# Patient Record
Sex: FEMALE | Race: WHITE | URBAN - METROPOLITAN AREA
[De-identification: names, ages, dates, MRNs, and addresses within clinical notes are randomized per-mention and may not be internally consistent; named-entity substitution may affect disease eponyms.]

---

## 2023-06-22 ENCOUNTER — OFFICE VISIT (OUTPATIENT)
Facility: LOCATION | Age: 58
End: 2023-06-22
Payer: COMMERCIAL

## 2023-06-22 DIAGNOSIS — H16.223 KERATOCONJUNCT SICCA, NOT SPECIFIED AS SJOGREN'S, BILATERAL: ICD-10-CM

## 2023-06-22 DIAGNOSIS — H04.123 DRY EYE SYNDROME OF BILATERAL LACRIMAL GLANDS: Primary | ICD-10-CM

## 2023-06-22 RX ORDER — LOTEPREDNOL ETABONATE 5 MG/ML
0.5 % SUSPENSION/ DROPS OPHTHALMIC
Qty: 5 | Refills: 1 | Status: ACTIVE
Start: 2023-06-22

## 2023-06-22 ASSESSMENT — INTRAOCULAR PRESSURE
OS: 13
OD: 12

## 2023-06-22 NOTE — IMPRESSION/PLAN
Impression: Keratoconjunct sicca, not specified as Sjogren's, bilateral: T22.461. Plan: See dry eye plan.

## 2023-06-22 NOTE — IMPRESSION/PLAN
Impression: Dry eye syndrome of bilateral lacrimal glands: H04.123. Silicone plugs BLL in place. Replaced extended plugs (0.4 mm) BUL today. Patient tolerated the procedure well. Plan: Patient to continue PFATs QID OU. Patient never received Loteprednol. Start Loteprednol (0.5%) QID OU. Patient can decrease to BID OU once discomfort subsides -rx sent to [facility]. RTC in 6 weeks for re-evaluation with Dr. Cheyanne Wills. Consider dry eye medicine next visit.

## 2024-01-11 ENCOUNTER — OFFICE VISIT (OUTPATIENT)
Facility: LOCATION | Age: 59
End: 2024-01-11

## 2024-01-11 DIAGNOSIS — H04.123 DRY EYE SYNDROME OF BILATERAL LACRIMAL GLANDS: Primary | ICD-10-CM

## 2024-01-11 PROCEDURE — 99213 OFFICE O/P EST LOW 20 MIN: CPT | Performed by: OPHTHALMOLOGY

## 2024-01-11 RX ORDER — CYCLOSPORINE 0 G/ML
0.09 % SOLUTION/ DROPS OPHTHALMIC; TOPICAL
Qty: 90 | Refills: 3 | Status: ACTIVE
Start: 2024-01-11

## 2024-01-11 ASSESSMENT — INTRAOCULAR PRESSURE
OS: 12
OD: 12

## 2024-02-08 ENCOUNTER — OFFICE VISIT (OUTPATIENT)
Facility: LOCATION | Age: 59
End: 2024-02-08
Payer: COMMERCIAL

## 2024-02-08 DIAGNOSIS — H16.223 KERATOCONJUNCT SICCA, NOT SPECIFIED AS SJOGREN'S, BILATERAL: ICD-10-CM

## 2024-02-08 PROCEDURE — 68761 CLOSE TEAR DUCT OPENING: CPT | Performed by: OPHTHALMOLOGY

## 2024-02-08 ASSESSMENT — INTRAOCULAR PRESSURE
OS: 14
OD: 12

## 2024-06-13 ENCOUNTER — OFFICE VISIT (OUTPATIENT)
Facility: LOCATION | Age: 59
End: 2024-06-13
Payer: COMMERCIAL

## 2024-06-13 DIAGNOSIS — H16.223 KERATOCONJUNCT SICCA, NOT SPECIFIED AS SJOGREN'S, BILATERAL: ICD-10-CM

## 2024-06-13 DIAGNOSIS — H04.123 DRY EYE SYNDROME OF BILATERAL LACRIMAL GLANDS: Primary | ICD-10-CM

## 2024-06-13 PROCEDURE — 99213 OFFICE O/P EST LOW 20 MIN: CPT | Performed by: OPHTHALMOLOGY

## 2024-06-13 RX ORDER — PERFLUOROHEXYLOCTANE 1 MG/MG
100 % SOLUTION OPHTHALMIC
Qty: 3 | Refills: 11 | Status: INACTIVE
Start: 2024-06-13 | End: 2025-06-12

## 2024-09-12 ENCOUNTER — OFFICE VISIT (OUTPATIENT)
Facility: LOCATION | Age: 59
End: 2024-09-12
Payer: COMMERCIAL

## 2024-09-12 DIAGNOSIS — H04.123 DRY EYE SYNDROME OF BILATERAL LACRIMAL GLANDS: Primary | ICD-10-CM

## 2024-09-12 DIAGNOSIS — H16.223 KERATOCONJUNCT SICCA, NOT SPECIFIED AS SJOGREN'S, BILATERAL: ICD-10-CM

## 2024-09-12 DIAGNOSIS — H25.13 AGE-RELATED NUCLEAR CATARACT, BILATERAL: ICD-10-CM

## 2024-09-12 PROCEDURE — 99213 OFFICE O/P EST LOW 20 MIN: CPT | Performed by: OPHTHALMOLOGY

## 2024-09-12 ASSESSMENT — INTRAOCULAR PRESSURE
OS: 16
OD: 16

## 2024-10-31 ENCOUNTER — OFFICE VISIT (OUTPATIENT)
Facility: LOCATION | Age: 59
End: 2024-10-31
Payer: COMMERCIAL

## 2024-10-31 DIAGNOSIS — H25.13 AGE-RELATED NUCLEAR CATARACT, BILATERAL: ICD-10-CM

## 2024-10-31 DIAGNOSIS — H16.223 KERATOCONJUNCT SICCA, NOT SPECIFIED AS SJOGREN'S, BILATERAL: ICD-10-CM

## 2024-10-31 DIAGNOSIS — H04.123 DRY EYE SYNDROME OF BILATERAL LACRIMAL GLANDS: Primary | ICD-10-CM

## 2024-10-31 PROCEDURE — 68761 CLOSE TEAR DUCT OPENING: CPT | Performed by: OPHTHALMOLOGY

## 2024-12-12 ENCOUNTER — OFFICE VISIT (OUTPATIENT)
Facility: LOCATION | Age: 59
End: 2024-12-12
Payer: COMMERCIAL

## 2024-12-12 DIAGNOSIS — H16.223 KERATOCONJUNCT SICCA, NOT SPECIFIED AS SJOGREN'S, BILATERAL: ICD-10-CM

## 2024-12-12 DIAGNOSIS — H04.123 DRY EYE SYNDROME OF BILATERAL LACRIMAL GLANDS: Primary | ICD-10-CM

## 2024-12-12 DIAGNOSIS — H25.13 AGE-RELATED NUCLEAR CATARACT, BILATERAL: ICD-10-CM

## 2024-12-12 PROCEDURE — 99213 OFFICE O/P EST LOW 20 MIN: CPT | Performed by: OPTOMETRIST

## 2024-12-12 ASSESSMENT — INTRAOCULAR PRESSURE
OD: 15
OS: 15

## 2024-12-23 ENCOUNTER — OFFICE VISIT (OUTPATIENT)
Facility: LOCATION | Age: 59
End: 2024-12-23
Payer: COMMERCIAL

## 2024-12-23 DIAGNOSIS — H16.223 KERATOCONJUNCT SICCA, NOT SPECIFIED AS SJOGREN'S, BILATERAL: ICD-10-CM

## 2024-12-23 DIAGNOSIS — H04.123 DRY EYE SYNDROME OF BILATERAL LACRIMAL GLANDS: Primary | ICD-10-CM

## 2025-02-21 ENCOUNTER — OFFICE VISIT (OUTPATIENT)
Facility: LOCATION | Age: 60
End: 2025-02-21
Payer: COMMERCIAL

## 2025-02-21 DIAGNOSIS — H04.123 DRY EYE SYNDROME OF BILATERAL LACRIMAL GLANDS: Primary | ICD-10-CM

## 2025-02-21 DIAGNOSIS — H25.13 AGE-RELATED NUCLEAR CATARACT, BILATERAL: ICD-10-CM

## 2025-02-21 DIAGNOSIS — H16.223 KERATOCONJUNCT SICCA, NOT SPECIFIED AS SJOGREN'S, BILATERAL: ICD-10-CM

## 2025-02-21 PROCEDURE — 99213 OFFICE O/P EST LOW 20 MIN: CPT | Performed by: OPTOMETRIST

## 2025-08-28 ENCOUNTER — OFFICE VISIT (OUTPATIENT)
Facility: LOCATION | Age: 60
End: 2025-08-28
Payer: COMMERCIAL

## 2025-08-28 DIAGNOSIS — H25.13 AGE-RELATED NUCLEAR CATARACT, BILATERAL: ICD-10-CM

## 2025-08-28 DIAGNOSIS — H16.223 KERATOCONJUNCT SICCA, NOT SPECIFIED AS SJOGREN'S, BILATERAL: ICD-10-CM

## 2025-08-28 DIAGNOSIS — H04.123 DRY EYE SYNDROME OF BILATERAL LACRIMAL GLANDS: Primary | ICD-10-CM

## 2025-08-28 PROCEDURE — 68761 CLOSE TEAR DUCT OPENING: CPT | Performed by: OPTOMETRIST
